# Patient Record
Sex: MALE | Race: WHITE | NOT HISPANIC OR LATINO | Employment: UNEMPLOYED | URBAN - METROPOLITAN AREA
[De-identification: names, ages, dates, MRNs, and addresses within clinical notes are randomized per-mention and may not be internally consistent; named-entity substitution may affect disease eponyms.]

---

## 2019-05-30 ENCOUNTER — OFFICE VISIT (OUTPATIENT)
Dept: INTERNAL MEDICINE | Facility: CLINIC | Age: 24
End: 2019-05-30

## 2019-05-30 VITALS
BODY MASS INDEX: 22.62 KG/M2 | HEIGHT: 72 IN | HEART RATE: 65 BPM | DIASTOLIC BLOOD PRESSURE: 62 MMHG | TEMPERATURE: 99.2 F | SYSTOLIC BLOOD PRESSURE: 98 MMHG | OXYGEN SATURATION: 98 % | WEIGHT: 167 LBS | RESPIRATION RATE: 16 BRPM

## 2019-05-30 DIAGNOSIS — H00.011 HORDEOLUM EXTERNUM OF RIGHT UPPER EYELID: Primary | ICD-10-CM

## 2019-05-30 PROCEDURE — 99213 OFFICE O/P EST LOW 20 MIN: CPT | Performed by: NURSE PRACTITIONER

## 2019-05-30 RX ORDER — ERYTHROMYCIN 5 MG/G
OINTMENT OPHTHALMIC EVERY 6 HOURS
Qty: 3.5 G | Refills: 0 | Status: SHIPPED | OUTPATIENT
Start: 2019-05-30 | End: 2020-05-29

## 2019-05-30 NOTE — PROGRESS NOTES
Chief Complaint   Patient presents with   • Eye Problem     RT       Subjective   Alex Solorzano is a 23 y.o. male is being seen for an acute appointment for right eye stye since January. It has gotten smaller, but is still there. He has been using a hot compress. No visual changes, non-tender,no discharge.     History of Present Illness     Current Outpatient Medications on File Prior to Visit   Medication Sig Dispense Refill   • [DISCONTINUED] amoxicillin-clavulanate (AUGMENTIN) 875-125 MG per tablet Take  by mouth every 12 (twelve) hours.       No current facility-administered medications on file prior to visit.        The following portions of the patient's history were reviewed and updated as appropriate: allergies, current medications, past family history, past medical history, past social history, past surgical history and problem list.    Review of Systems   Constitutional: Negative.    HENT: Negative.    Eyes: Negative.  Negative for photophobia, redness and visual disturbance.   Respiratory: Negative.    Cardiovascular: Negative.  Negative for chest pain, palpitations and leg swelling.   Gastrointestinal: Negative.    Endocrine: Negative.    Genitourinary: Negative.    Musculoskeletal: Negative.    Allergic/Immunologic: Negative.    Neurological: Negative.    Hematological: Negative.        Objective   Physical Exam   Constitutional: He is oriented to person, place, and time. He appears well-developed and well-nourished. No distress.   Eyes: Right eye exhibits hordeolum. Right conjunctiva is not injected. Right conjunctiva has no hemorrhage. Left conjunctiva is not injected. Left conjunctiva has no hemorrhage.   Cardiovascular: Normal rate, regular rhythm and normal heart sounds.   Pulmonary/Chest: Effort normal and breath sounds normal. No stridor. No respiratory distress.   Neurological: He is alert and oriented to person, place, and time.   Vitals reviewed.      Assessment/Plan   Alex was seen today for  eye problem.    Diagnoses and all orders for this visit:    Hordeolum externum of right upper eyelid  -     erythromycin (ROMYCIN) 5 MG/GM ophthalmic ointment; Administer  to the right eye Every 6 (Six) Hours. Apply 1/2 cm ribbon to right upper lid    Use a hot compress to eye 10 minutes twice daily. Change out contacts.     Follow up as needed

## 2019-05-30 NOTE — PATIENT INSTRUCTIONS
Stye  A stye, also known as a hordeolum, is a bump that forms on an eyelid. It may look like a pimple next to the eyelash. A stye can form inside the eyelid (internal stye) or outside the eyelid (external stye). A stye can cause redness, swelling, and pain on the eyelid.  Styes are very common. Anyone can get them at any age. They usually occur in just one eye, but you may have more than one in either eye.  What are the causes?  A stye is caused by an infection. The infection is almost always caused by bacteria called Staphylococcus aureus. This is a common type of bacteria that lives on the skin.  An internal stye may result from an infected oil-producing gland inside the eyelid. An external stye may be caused by an infection at the base of the eyelash (hair follicle).  What increases the risk?  You are more likely to develop a stye if:  · You have had a stye before.  · You have any of these conditions:  ? Diabetes.  ? Red, itchy, inflamed eyelids (blepharitis).  ? A skin condition such as seborrheic dermatitis or rosacea.  ? High fat levels in your blood (lipids).    What are the signs or symptoms?  The most common symptom of a stye is eyelid pain. Internal styes are more painful than external styes. Other symptoms may include:  · Painful swelling of your eyelid.  · A scratchy feeling in your eye.  · Tearing and redness of your eye.  · Pus draining from the stye.    How is this diagnosed?  Your health care provider may be able to diagnose a stye just by examining your eye. The health care provider may also check to make sure:  · You do not have a fever or other signs of a more serious infection.  · The infection has not spread to other parts of your eye or areas around your eye.    How is this treated?  Most styes will clear up in a few days without treatment or with warm compresses applied to the area. You may need to use antibiotic drops or ointment to treat an infection.  In some cases, if your stye does not  heal with routine treatment, your health care provider may drain pus from the stye using a thin blade or needle. This may be done if the stye is large, causing a lot of pain, or affecting your vision.  Follow these instructions at home:  · Take over-the-counter and prescription medicines only as told by your health care provider. This includes eye drops or ointments.  · If you were prescribed an antibiotic medicine, apply or use it as told by your health care provider. Do not stop using the antibiotic even if your condition improves.  · Apply a warm, wet cloth (warm compress) to your eye for 5-10 minutes, 4 times a day.  · Clean the affected eyelid as directed by your health care provider.  · Do not wear contact lenses or eye makeup until your stye has healed.  · Do not try to pop or drain the stye.  · Do not rub your eye.  Contact a health care provider if:  · You have chills or a fever.  · Your stye does not go away after several days.  · Your stye affects your vision.  · Your eyeball becomes swollen, red, or painful.  Get help right away if:  · You have pain when moving your eye around.  Summary  · A stye is a bump that forms on an eyelid. It may look like a pimple next to the eyelash.  · A stye can form inside the eyelid (internal stye) or outside the eyelid (external stye). A stye can cause redness, swelling, and pain on the eyelid.  · Your health care provider may be able to diagnose a stye just by examining your eye.  · Apply a warm, wet cloth (warm compress) to your eye for 5-10 minutes, 4 times a day.  This information is not intended to replace advice given to you by your health care provider. Make sure you discuss any questions you have with your health care provider.  Document Released: 09/27/2006 Document Revised: 08/30/2018 Document Reviewed: 08/30/2018  [employer] Interactive Patient Education © 2019 [employer]